# Patient Record
Sex: FEMALE | Race: WHITE | NOT HISPANIC OR LATINO | Employment: UNEMPLOYED | URBAN - NONMETROPOLITAN AREA
[De-identification: names, ages, dates, MRNs, and addresses within clinical notes are randomized per-mention and may not be internally consistent; named-entity substitution may affect disease eponyms.]

---
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
## 2025-04-02 ENCOUNTER — E-VISIT (OUTPATIENT)
Dept: ADMINISTRATIVE | Facility: OTHER | Age: 20
End: 2025-04-02

## 2025-04-02 NOTE — E-VISIT ESCALATED
Status: Referred Out  Date: 2025 11:29:57  Acuity Level: Not applicable  Referral message: Based on the information you provided during your interview, eVisit is not appropriate for treating your condition.  Patient: Erin Hare  Patient : 2005  Patient Address: [address] [place] [address]  Patient Phone: [phone]  Clinician Response: Unavailable  Diagnosis: Unavailable  Diagnosis ICD: Unavailable     Patient Interview Questions and Responses: None available

## 2025-04-23 ENCOUNTER — OFFICE VISIT (OUTPATIENT)
Age: 20
End: 2025-04-23

## 2025-04-23 VITALS
SYSTOLIC BLOOD PRESSURE: 108 MMHG | BODY MASS INDEX: 25.78 KG/M2 | HEIGHT: 64 IN | DIASTOLIC BLOOD PRESSURE: 62 MMHG | WEIGHT: 151 LBS

## 2025-04-23 DIAGNOSIS — Z11.3 SCREEN FOR STD (SEXUALLY TRANSMITTED DISEASE): ICD-10-CM

## 2025-04-23 DIAGNOSIS — Z30.019 ENCOUNTER FOR INITIAL PRESCRIPTION OF CONTRACEPTIVES, UNSPECIFIED CONTRACEPTIVE: Primary | ICD-10-CM

## 2025-04-23 LAB
B-HCG UR QL: NEGATIVE
EXPIRATION DATE: NORMAL
INTERNAL NEGATIVE CONTROL: NEGATIVE
INTERNAL POSITIVE CONTROL: POSITIVE
Lab: NORMAL

## 2025-04-23 RX ORDER — NORELGESTROMIN AND ETHINYL ESTRADIOL 35; 150 UG/MG; UG/MG
1 PATCH TRANSDERMAL WEEKLY
Qty: 9 PATCH | Refills: 1 | Status: SHIPPED | OUTPATIENT
Start: 2025-04-23

## 2025-04-23 NOTE — PROGRESS NOTES
"Chief Complaint  Contraception (Pt is here wanting to discuss birth control options.  Pt thinks she would like to try a patch.  )  History of Present Illness  The patient is a 19-year-old female who presents for evaluation of birth control.    Previously, oral contraceptives were used but discontinued due to forgetfulness and subsequent illness. Approximately 2 years have passed since the last use of birth control. An intrauterine device (IUD) was considered but not pursued due to insurance limitations. Currently, a new method of contraception is sought.     Menstrual cycle irregularity has been noted over the past year. The most recent period occurred 15 days later than expected, with heavy bleeding, although the duration was consistent with her usual cycle. Typically, periods are light.      Subjective          Erin Hare presents to [facility]  History of Present Illness    Review of Systems   Genitourinary:  Positive for menstrual problem.         Objective   Vital Signs:   /62   Ht 162.6 cm (64\")   Wt 68.5 kg (151 lb)   BMI 25.92 kg/m²     Physical Exam  Vitals reviewed.   Constitutional:       Appearance: She is well-developed.   Eyes:      General:         Right eye: No discharge.         Left eye: No discharge.   Cardiovascular:      Rate and Rhythm: Normal rate and regular rhythm.   Pulmonary:      Effort: Pulmonary effort is normal.      Breath sounds: Normal breath sounds.   Skin:     General: Skin is warm.   Neurological:      Mental Status: She is alert and oriented to person, place, and time.   Psychiatric:         Behavior: Behavior normal.         Thought Content: Thought content normal.         Judgment: Judgment normal.       Physical Exam      Result Review :   The following data was reviewed by: DARNELL Singh on 04/23/2025:  OBGYN Diagnostics Review:   HCG, Urine, QL   Date Value Ref Range Status   04/23/2025 Negative Negative Final               "   Current Outpatient Medications on File Prior to Visit   Medication Sig    hydrOXYzine (ATARAX) 10 MG tablet TAKE ONE TABLET BY MOUTH 3 TIMES A DAY AS NEEDED FOR ANXIETY (Patient not taking: Reported on 4/23/2025)     No current facility-administered medications on file prior to visit.          Assessment and Plan      Assessment & Plan  1. Contraceptive management  A comprehensive discussion was held regarding various contraceptive options, including the patch, NuvaRing, Nexplanon, and IUD. Preference was expressed for the patch. A prescription for the traditional patch was provided, with instructions to commence its use on the Sunday following the onset of the menstrual cycle. Potential side effects of the patch were discussed, and any issues with adherence or cost concerns should be reported. If the patch does not adhere well, an alternative brand that can be obtained through a specialty pharmacy will be considered. If discontinuation of the patch is desired, transitioning to the NuvaRing is an option.    2. Menstrual irregularities  Recent menstrual irregularities were discussed, and it was explained that these could be attributed to normal variations in the menstrual cycle, particularly in the absence of hormonal regulation. Factors such as weight changes, diet modifications, medication use, illness, and stress levels can all influence the menstrual cycle.    3. Screening for sexually transmitted diseases  A pregnancy test conducted today yielded a negative result. Basic STD testing for gonorrhea, chlamydia, and trichomonas will be performed using a urine sample. The results will be available on the [employer] system.    Follow-up  Scheduled for a follow-up visit in 3 months.    Diagnoses and all orders for this visit:    1. Encounter for initial prescription of contraceptives, unspecified contraceptive (Primary)  -     POC Pregnancy, Urine    2. Screen for STD (sexually transmitted disease)  -     Chlamydia  trachomatis, Neisseria gonorrhoeae, Trichomonas vaginalis, PCR - Urine, Urine, Clean Catch    Other orders  -     norelgestromin-ethinyl estradiol (ORTHO EVRA) 150-35 MCG/24HR; Place 1 patch on the skin as directed by provider 1 (One) Time Per Week.  Dispense: 9 patch; Refill: 1          Pediatric BMI = 83 %ile (Z= 0.95) based on CDC (Girls, 2-20 Years) BMI-for-age based on BMI available on 4/23/2025.. BMI is >= 25 and <30. (Overweight) The following options were offered after discussion;: information on healthy weight added to patient's after visit summary        Follow Up   Return in about 3 months (around 7/23/2025) for med check.    Patient was given instructions and counseling regarding her condition or for health maintenance advice. Please see specific information pulled into the AVS if appropriate.       Patient or patient representative verbalized consent for the use of Ambient Listening during the visit with  DARNELL Singh for chart documentation. 5/4/2025  14:59 CDT

## 2025-04-25 LAB
C TRACH RRNA SPEC QL NAA+PROBE: NEGATIVE
N GONORRHOEA RRNA SPEC QL NAA+PROBE: NEGATIVE
T VAGINALIS RRNA SPEC QL NAA+PROBE: NEGATIVE

## 2025-05-04 NOTE — PATIENT INSTRUCTIONS

## 2025-05-27 RX ORDER — NORETHINDRONE ACETATE AND ETHINYL ESTRADIOL 1MG-20(21)
1 KIT ORAL DAILY
Qty: 84 TABLET | Refills: 0 | Status: SHIPPED | OUTPATIENT
Start: 2025-05-27

## 2025-06-17 RX ORDER — NORETHINDRONE ACETATE AND ETHINYL ESTRADIOL 1MG-20(21)
1 KIT ORAL DAILY
Qty: 84 TABLET | Refills: 0 | OUTPATIENT
Start: 2025-06-17

## 2025-06-17 RX ORDER — NORETHINDRONE ACETATE AND ETHINYL ESTRADIOL AND FERROUS FUMARATE 1MG-20(21)
1 KIT ORAL DAILY
Qty: 84 TABLET | Refills: 0 | Status: SHIPPED | OUTPATIENT
Start: 2025-06-17

## 2025-07-11 RX ORDER — NORETHINDRONE ACETATE AND ETHINYL ESTRADIOL 1MG-20(21)
1 KIT ORAL DAILY
Qty: 84 TABLET | Refills: 0 | OUTPATIENT
Start: 2025-07-11

## 2025-07-17 ENCOUNTER — OFFICE VISIT (OUTPATIENT)
Age: 20
End: 2025-07-17

## 2025-07-17 VITALS — HEIGHT: 63 IN | BODY MASS INDEX: 27.11 KG/M2 | WEIGHT: 153 LBS

## 2025-07-17 DIAGNOSIS — M25.551 RIGHT HIP PAIN: Primary | ICD-10-CM

## 2025-07-17 PROCEDURE — 99204 OFFICE O/P NEW MOD 45 MIN: CPT | Performed by: PHYSICIAN ASSISTANT

## 2025-07-17 RX ORDER — NORETHINDRONE ACETATE AND ETHINYL ESTRADIOL AND FERROUS FUMARATE 1MG-20(21)
1 KIT ORAL DAILY
COMMUNITY
Start: 2025-06-17

## 2025-07-17 ASSESSMENT — ENCOUNTER SYMPTOMS
COLOR CHANGE: 0
BACK PAIN: 0
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
## 2025-07-17 NOTE — ASSESSMENT & PLAN NOTE
Weightbearing x-rays of the right hip today in office demonstrate well-maintained joint spaces with no acute osseous abnormality appreciated.    Due to the lack of diagnostic findings on x-ray and the patient's consistent discomfort and lack of improvement with conservative treatment, I would like to further evaluate for a labral tear with an MR arthrogram of the hip.  Depending on what is done with this we discussed potential treatment options including surgical intervention versus steroid injections, anti-inflammatories, physical therapy.  We will further discuss this once we have her imaging and are able to better evaluate.

## 2025-07-18 RX ORDER — NORETHINDRONE ACETATE AND ETHINYL ESTRADIOL 1MG-20(21)
1 KIT ORAL DAILY
Qty: 84 TABLET | Refills: 0 | Status: SHIPPED | OUTPATIENT
Start: 2025-07-18

## 2025-07-23 ENCOUNTER — OFFICE VISIT (OUTPATIENT)
Age: 20
End: 2025-07-23

## 2025-07-23 VITALS
WEIGHT: 150 LBS | DIASTOLIC BLOOD PRESSURE: 68 MMHG | SYSTOLIC BLOOD PRESSURE: 104 MMHG | BODY MASS INDEX: 25.61 KG/M2 | HEIGHT: 64 IN

## 2025-07-23 DIAGNOSIS — Z79.899 MEDICATION MANAGEMENT: ICD-10-CM

## 2025-07-23 DIAGNOSIS — Z30.41 SURVEILLANCE FOR BIRTH CONTROL, ORAL CONTRACEPTIVES: Primary | ICD-10-CM

## 2025-07-23 RX ORDER — NORETHINDRONE ACETATE AND ETHINYL ESTRADIOL 1MG-20(21)
1 KIT ORAL DAILY
Qty: 84 TABLET | Refills: 3 | Status: SHIPPED | OUTPATIENT
Start: 2025-07-23

## 2025-07-23 RX ORDER — BUSPIRONE HYDROCHLORIDE 10 MG/1
TABLET ORAL EVERY 12 HOURS SCHEDULED
COMMUNITY

## 2025-07-23 NOTE — PROGRESS NOTES
"Chief Complaint  Med Management (Pt is here for a three month recheck on [place] FE birth control.   Pt has no complaints today )  History of Present Illness  The patient is a 20-year-old female who presents for a refill of her birth control pills.    She reports a positive experience with her current birth control regimen, with no instances of intermenstrual bleeding. Her menstrual cycle has been regular, although she has experienced an increase in cramping, which she attributes to her history of severe menstrual cramps. She maintains adequate hydration during her menstrual period. She has been adhering to the prescribed schedule.    Subjective          Erin Hare presents to [facility]  History of Present Illness    Review of Systems   Constitutional:  Negative for activity change, appetite change, fatigue and fever.   Respiratory:  Negative for apnea and shortness of breath.    Cardiovascular:  Negative for chest pain and palpitations.   Gastrointestinal:  Negative for abdominal distention, abdominal pain, constipation, diarrhea, nausea and vomiting.   Endocrine: Negative for cold intolerance and heat intolerance.   Genitourinary:  Negative for difficulty urinating, frequency, menstrual problem, pelvic pain, vaginal discharge and vaginal pain.        Cramping at times     Neurological:  Negative for headaches.   Psychiatric/Behavioral:  Negative for agitation and sleep disturbance.          Objective   Vital Signs:   /68   Ht 162.6 cm (64\")   Wt 68 kg (150 lb)   BMI 25.75 kg/m²     Physical Exam  Vitals reviewed.   Constitutional:       Appearance: She is well-developed.   Eyes:      General:         Right eye: No discharge.         Left eye: No discharge.   Cardiovascular:      Rate and Rhythm: Normal rate and regular rhythm.   Pulmonary:      Effort: Pulmonary effort is normal.      Breath sounds: Normal breath sounds.   Skin:     General: Skin is warm.   Neurological:      " Mental Status: She is alert and oriented to person, place, and time.   Psychiatric:         Behavior: Behavior normal.         Thought Content: Thought content normal.         Judgment: Judgment normal.       Physical Exam      Result Review :   The following data was reviewed by: DARNELL Singh on 07/23/2025:  OBGYN Diagnostics Review:   Chlamydia trachomatis, TAIWO   Date Value Ref Range Status   04/23/2025 Negative Negative Final     Gonococcus by TAIWO   Date Value Ref Range Status   04/23/2025 Negative Negative Final        Assessment & Plan  1. Contraceptive management.  - Responding well to current contraceptive regimen with no reported breakthrough bleeding.  - Advised to maintain a consistent schedule for taking birth control pills. Encouraged to stay hydrated during menstrual period and ensure regular bowel movements. Suggested use of ibuprofen for pain management and cautioned against excessive caffeine intake during menstrual period.  - Prescription for a 3-month supply of birth control pills provided, to be filled at pharmacy. Instructed to inform promptly if any issues arise before next appointment so alternative contraceptive options can be considered.    Follow-up: Next scheduled visit in 07/2026.    Diagnoses and all orders for this visit:    1. Surveillance for birth control, oral contraceptives (Primary)    2. Medication management    Other orders  -     norethindrone-ethinyl estradiol FE (Tarina FE 1/20 EQ) 1-20 MG-MCG per tablet; Take 1 tablet by mouth Daily.  Dispense: 84 tablet; Refill: 3                  Follow Up   Return for Annual physical.    Patient was given instructions and counseling regarding her condition or for health maintenance advice. Please see specific information pulled into the AVS if appropriate.       Patient or patient representative verbalized consent for the use of Ambient Listening during the visit with  DARNELL Singh for chart documentation.  7/23/2025  14:12 CDT

## 2025-07-23 NOTE — PATIENT INSTRUCTIONS

## 2025-08-12 RX ORDER — NORETHINDRONE ACETATE AND ETHINYL ESTRADIOL 1MG-20(21)
1 KIT ORAL DAILY
Qty: 84 TABLET | Refills: 3 | OUTPATIENT
Start: 2025-08-12

## 2025-08-15 RX ORDER — NORETHINDRONE ACETATE AND ETHINYL ESTRADIOL 1MG-20(21)
1 KIT ORAL DAILY
Qty: 84 TABLET | Refills: 3 | OUTPATIENT
Start: 2025-08-15

## 2025-08-19 RX ORDER — NORETHINDRONE ACETATE AND ETHINYL ESTRADIOL 1MG-20(21)
1 KIT ORAL DAILY
Qty: 84 TABLET | Refills: 3 | Status: SHIPPED | OUTPATIENT
Start: 2025-08-19